# Patient Record
Sex: MALE | Race: OTHER
[De-identification: names, ages, dates, MRNs, and addresses within clinical notes are randomized per-mention and may not be internally consistent; named-entity substitution may affect disease eponyms.]

---

## 2021-09-11 ENCOUNTER — HOSPITAL ENCOUNTER (EMERGENCY)
Dept: HOSPITAL 47 - EC | Age: 26
Discharge: HOME | End: 2021-09-11
Payer: COMMERCIAL

## 2021-09-11 VITALS
HEART RATE: 104 BPM | TEMPERATURE: 98.8 F | SYSTOLIC BLOOD PRESSURE: 138 MMHG | DIASTOLIC BLOOD PRESSURE: 78 MMHG | RESPIRATION RATE: 18 BRPM

## 2021-09-11 DIAGNOSIS — Z20.822: Primary | ICD-10-CM

## 2021-09-11 DIAGNOSIS — F17.200: ICD-10-CM

## 2021-09-11 PROCEDURE — 99283 EMERGENCY DEPT VISIT LOW MDM: CPT

## 2021-09-11 PROCEDURE — 87635 SARS-COV-2 COVID-19 AMP PRB: CPT

## 2021-09-11 NOTE — ED
General Adult HPI





- General


Source: patient, RN notes reviewed


Mode of arrival: ambulatory


Limitations: no limitations





<Dain Delgado - Last Filed: 09/11/21 15:01>





<Massiel Anaya - Last Filed: 09/12/21 01:33>





- General


Chief complaint: Recheck/Abnormal Lab/Rx


Stated complaint: Covid test


Time Seen by Provider: 09/11/21 13:58





- History of Present Illness


Initial comments: 





This a 26-year-old male presents emergency Department chief complaint of needing

COVID-19 testing.  Patient states his testicles a few days ago.  Patient is 

asymptomatic denies any fevers chills cough congestion sore throat nausea 

vomiting diarrhea constipation.  Patient offers no other complaints. 

(Dain Delgado)





- Related Data


                                    Allergies











Allergy/AdvReac Type Severity Reaction Status Date / Time


 


No Known Allergies Allergy   Verified 09/11/21 13:57














Review of Systems


ROS Other: All systems not noted in ROS Statement are negative.





<Dain Delgado - Last Filed: 09/11/21 15:01>


ROS Other: All systems not noted in ROS Statement are negative.





<Massiel Anaya - Last Filed: 09/12/21 01:33>


ROS Statement: 


Those systems with pertinent positive or pertinent negative responses have been 

documented in the HPI.








Past Medical History


Past Medical History: No Reported History


History of Any Multi-Drug Resistant Organisms: None Reported


Past Surgical History: No Surgical Hx Reported


Past Psychological History: No Psychological Hx Reported


Smoking Status: Current every day smoker


Past Alcohol Use History: Occasional


Past Drug Use History: Marijuana





<Dain Delgado - Last Filed: 09/11/21 15:01>





General Exam


Limitations: no limitations


General appearance: alert, in no apparent distress


Head exam: Present: atraumatic, normocephalic, normal inspection


Eye exam: Present: normal appearance, PERRL, EOMI.  Absent: scleral icterus, 

conjunctival injection, periorbital swelling


ENT exam: Present: normal exam, mucous membranes moist


Neck exam: Present: normal inspection, full ROM.  Absent: tenderness, 

meningismus, lymphadenopathy


Respiratory exam: Present: normal lung sounds bilaterally.  Absent: respiratory 

distress, wheezes, rales, rhonchi, stridor


Cardiovascular Exam: Present: regular rate, normal rhythm, normal heart sounds. 

Absent: systolic murmur, diastolic murmur, rubs, gallop, clicks


GI/Abdominal exam: Present: soft, normal bowel sounds.  Absent: distended, 

tenderness, guarding, rebound, rigid


Neurological exam: Present: alert


Skin exam: Present: warm, dry, intact, normal color.  Absent: rash





<Dain Delgado - Last Filed: 09/11/21 15:01>





Course





                                   Vital Signs











  09/11/21





  13:55


 


Temperature 98.8 F


 


Pulse Rate 104 H


 


Respiratory 18





Rate 


 


Blood Pressure 138/78


 


O2 Sat by Pulse 99





Oximetry 














Medical Decision Making





<Dain Delgado - Last Filed: 09/11/21 15:01>





- Medical Decision Making





26 show present for Covid test test is negative patient we discharged hi 

(Dain Delgado)





- Lab Data





                                   Lab Results











  09/11/21 Range/Units





  14:14 


 


Coronavirus (PCR)  Not Detected  (Not Detectd)  














Disposition


Is patient prescribed a controlled substance at d/c from ED?: No


Time of Disposition: 15:02





<Dain Delgado - Last Filed: 09/11/21 15:01>





<Massiel Anaya - Last Filed: 09/12/21 01:33>


Clinical Impression: 


 Encounter for laboratory testing for COVID-19 virus





Disposition: HOME SELF-CARE


Condition: Stable


Additional Instructions: 


Please return to the Emergency Department if symptoms worsen or any other 

concerns.


Referrals: 


None,Stated [Primary Care Provider] - 1-2 days

## 2021-11-19 ENCOUNTER — HOSPITAL ENCOUNTER (EMERGENCY)
Dept: HOSPITAL 47 - EC | Age: 26
Discharge: HOME | End: 2021-11-19
Payer: COMMERCIAL

## 2021-11-19 VITALS
HEART RATE: 85 BPM | DIASTOLIC BLOOD PRESSURE: 71 MMHG | RESPIRATION RATE: 18 BRPM | TEMPERATURE: 97.5 F | SYSTOLIC BLOOD PRESSURE: 110 MMHG

## 2021-11-19 DIAGNOSIS — F12.90: ICD-10-CM

## 2021-11-19 DIAGNOSIS — F17.200: ICD-10-CM

## 2021-11-19 DIAGNOSIS — Z02.83: Primary | ICD-10-CM

## 2021-11-19 DIAGNOSIS — F10.129: ICD-10-CM

## 2021-11-19 PROCEDURE — 99281 EMR DPT VST MAYX REQ PHY/QHP: CPT

## 2021-11-19 NOTE — ED
Medical Clearance HPI





- General


Chief complaint: Drug Screen


Stated complaint: [facility] drug screen


Time Seen by Provider: 11/19/21 23:26


Source: patient


Mode of arrival: ambulatory





- History of Present Illness


Initial comments: 


46-year-old male patient presented to the emergency department sent by his 

employer due to "weird behavior". They are requesting drug screen and breath 

alcohol testing. Patient states he did have alcohol when he got out of work at 

0530 this morning. States that he did not drink prior to going in tonight at 

2030. States he feels well. Denies any headache, blurred vision, or weakness. 

Denies any recent head injury. Denies any injuries at work. 





Allergies/Adverse reactions: 


                                    Allergies











Allergy/AdvReac Type Severity Reaction Status Date / Time


 


No Known Allergies Allergy   Verified 11/19/21 23:12














Review of Systems


ROS Statement: 


Those systems with pertinent positive or pertinent negative responses have been 

documented in the HPI.





ROS Other: All systems not noted in ROS Statement are negative.





Past Medical History


Past Medical History: No Reported History


History of Any Multi-Drug Resistant Organisms: None Reported


Past Surgical History: No Surgical Hx Reported


Past Psychological History: No Psychological Hx Reported


Smoking Status: Current every day smoker


Past Alcohol Use History: Occasional


Past Drug Use History: Marijuana





General Exam


Limitations: no limitations


General appearance: alert, in no apparent distress


Eye exam: Present: normal appearance, PERRL, EOMI.  Absent: scleral icterus, 

conjunctival injection, periorbital swelling


ENT exam: Present: normal exam, normal oropharynx, mucous membranes moist


Respiratory exam: Present: normal lung sounds bilaterally.  Absent: respiratory 

distress, wheezes, rales, rhonchi, stridor


Cardiovascular Exam: Present: regular rate, normal rhythm, normal heart sounds. 

Absent: systolic murmur, diastolic murmur, rubs, gallop, clicks


GI/Abdominal exam: Present: soft, normal bowel sounds.  Absent: distended, 

tenderness, guarding, rebound, rigid


Neurological exam: Present: alert, oriented X3, CN II-XII intact


Psychiatric exam: Present: normal affect, normal mood


Skin exam: Present: warm, dry, intact, normal color.  Absent: rash





Course


                                   Vital Signs











  11/19/21





  23:10


 


Temperature 97.5 F L


 


Pulse Rate 85


 


Respiratory 18





Rate 


 


Blood Pressure 110/71


 


O2 Sat by Pulse 100





Oximetry 














Medical Decision Making





- Medical Decision Making


26 year-old male patient presents to the emergency department today for drug 

screen and blood alcohol test.  He was sent in by his employer due to abnormal 

behavior.  Physical examination is unremarkable.  His aunt all questions 

appropriately.  We did obtain drug screen.  His initial breath alcohol was 0.20,

repeat was 0.19.  He is advised not to return to work tonight.  He is taking a 

cab home.  He is instructed to follow-up with employee health services as 

needed.  Return parameters were discussed in detail.  He verbalizes 

understanding and agrees this plan.  My attending is Dr. Case.








Disposition


Clinical Impression: 


 Encounter for drug screening, Alcohol intoxication





Disposition: HOME SELF-CARE


Condition: Good


Instructions (If sedation given, give patient instructions):  Alcohol 

Intoxication (ED)


Additional Instructions: 


Follow-up with employee health services as needed.  Return for any new, 

worsening, or concerning symptoms.


Is patient prescribed a controlled substance at d/c from ED?: No


Referrals: 


None,Stated [Primary Care Provider] - 1-2 days


Time of Disposition: 23:45